# Patient Record
Sex: MALE | Race: WHITE | NOT HISPANIC OR LATINO | Employment: STUDENT | URBAN - METROPOLITAN AREA
[De-identification: names, ages, dates, MRNs, and addresses within clinical notes are randomized per-mention and may not be internally consistent; named-entity substitution may affect disease eponyms.]

---

## 2024-08-08 ENCOUNTER — PATIENT MESSAGE (OUTPATIENT)
Dept: ENDOCRINOLOGY | Facility: CLINIC | Age: 29
End: 2024-08-08
Payer: COMMERCIAL

## 2024-08-08 ENCOUNTER — OFFICE VISIT (OUTPATIENT)
Dept: ENDOCRINOLOGY | Facility: CLINIC | Age: 29
End: 2024-08-08
Attending: INTERNAL MEDICINE
Payer: COMMERCIAL

## 2024-08-08 DIAGNOSIS — Z79.899 TRANSGENDER WOMAN ON HORMONE THERAPY: Primary | ICD-10-CM

## 2024-08-08 DIAGNOSIS — F64.0 TRANSGENDER WOMAN ON HORMONE THERAPY: Primary | ICD-10-CM

## 2024-08-08 PROCEDURE — 99204 OFFICE O/P NEW MOD 45 MIN: CPT | Mod: 95,,, | Performed by: INTERNAL MEDICINE

## 2024-08-08 PROCEDURE — G2211 COMPLEX E/M VISIT ADD ON: HCPCS | Mod: 95,,, | Performed by: INTERNAL MEDICINE

## 2024-08-08 PROCEDURE — 1160F RVW MEDS BY RX/DR IN RCRD: CPT | Mod: CPTII,95,, | Performed by: INTERNAL MEDICINE

## 2024-08-08 PROCEDURE — 1159F MED LIST DOCD IN RCRD: CPT | Mod: CPTII,95,, | Performed by: INTERNAL MEDICINE

## 2024-08-08 RX ORDER — SPIRONOLACTONE 50 MG/1
50 TABLET, FILM COATED ORAL 2 TIMES DAILY
Qty: 180 TABLET | Refills: 3 | Status: SHIPPED | OUTPATIENT
Start: 2024-08-08

## 2024-08-08 RX ORDER — ATORVASTATIN CALCIUM 40 MG/1
40 TABLET, FILM COATED ORAL DAILY
COMMUNITY
Start: 2022-02-07

## 2024-08-08 RX ORDER — ESTRADIOL 1 MG/1
1 TABLET ORAL 2 TIMES DAILY
Qty: 180 TABLET | Refills: 3 | Status: SHIPPED | OUTPATIENT
Start: 2024-08-08

## 2024-08-21 ENCOUNTER — PATIENT MESSAGE (OUTPATIENT)
Dept: ENDOCRINOLOGY | Facility: CLINIC | Age: 29
End: 2024-08-21
Payer: COMMERCIAL

## 2024-09-25 ENCOUNTER — PATIENT MESSAGE (OUTPATIENT)
Dept: ENDOCRINOLOGY | Facility: CLINIC | Age: 29
End: 2024-09-25
Payer: COMMERCIAL

## 2024-10-04 ENCOUNTER — PATIENT MESSAGE (OUTPATIENT)
Dept: ENDOCRINOLOGY | Facility: CLINIC | Age: 29
End: 2024-10-04
Payer: COMMERCIAL

## 2024-11-29 ENCOUNTER — OFFICE VISIT (OUTPATIENT)
Dept: ENDOCRINOLOGY | Facility: CLINIC | Age: 29
End: 2024-11-29
Attending: INTERNAL MEDICINE
Payer: COMMERCIAL

## 2024-11-29 DIAGNOSIS — Z79.899 TRANSGENDER WOMAN ON HORMONE THERAPY: Primary | ICD-10-CM

## 2024-11-29 DIAGNOSIS — F64.0 TRANSGENDER WOMAN ON HORMONE THERAPY: Primary | ICD-10-CM

## 2024-11-29 RX ORDER — DUTASTERIDE 0.5 MG/1
0.5 CAPSULE, LIQUID FILLED ORAL DAILY
Qty: 90 CAPSULE | Refills: 3 | Status: SHIPPED | OUTPATIENT
Start: 2024-11-29 | End: 2025-11-29

## 2024-11-29 NOTE — PROGRESS NOTES
Subjective:      Patient ID: Vitaly Cr is a 29 y.o.    Chief Complaint:  gender     History of Present Illness  With regards to their  gender incongruence care:    Gender identity - female    Pronouns: she/her       Name legally changed?  no  Gender marker legally changed? 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    There is no height or weight on file to calculate BMI.      Physical Exam  Constitutional:       Appearance: Normal appearance.   Psychiatric:         Mood and Affect: Mood normal.         Behavior: Behavior normal.         Thought Content: Thought content normal.         Judgment: Judgment normal.               Lab Review:     Under the media tab.  lfts and TSH are normal   Hemoglobin A1c is 5.1    Assessment and Plan     Transgender woman on hormone therapy  Transwoman: gender incongruence   Reviewed therapy, side effects (both wanted and unwanted), possible adverse outcomes, expectations, compliance. Reviewed limited data on fertility, expect decline over time...           Will start  estradiol 1 mg po bid     Will start    aldactone 50 mg bid       We will  start dutasteride      RTO in 3 months with labs prior   Healthy lifestyle stressed  Discussed increased risk of dvt   Avoid prolonged immobility  D/c ert prior to any procedures           The patient location is: [place]  The chief complaint leading to consultation is: above     Visit type: audiovisual    Level of service is based on medical decision-making and not time      Each patient to whom he or she provides medical services by telemedicine is:  (1) informed of the relationship between the physician and patient and the respective role of any other health care provider with respect to management of the patient; and (2) notified that he or she may decline to receive medical services by telemedicine and may withdraw from such care at any time.

## 2024-11-29 NOTE — PATIENT INSTRUCTIONS
Thank you for completing a virtual visit with me!     Per our conversation,  I will send in a prescription for the hair loss medication.  Okay to start 3 medications at this time and we will plan a virtual visit in 3 months with blood work prior.  We will mail you the lab slip so you can have your labs drawn locally.     Please let me know if you have any other questions.    Thank you,  Neelam Mancini MD

## 2024-11-29 NOTE — ASSESSMENT & PLAN NOTE
Transwoman: gender incongruence   Reviewed therapy, side effects (both wanted and unwanted), possible adverse outcomes, expectations, compliance. Reviewed limited data on fertility, expect decline over time...           Will start  estradiol 1 mg po bid     Will start    aldactone 50 mg bid       We will start dutasteride      RTO in 3 months with labs prior   Healthy lifestyle stressed  Discussed increased risk of dvt   Avoid prolonged immobility  D/c ert prior to any procedures

## 2024-12-20 ENCOUNTER — PATIENT MESSAGE (OUTPATIENT)
Dept: ENDOCRINOLOGY | Facility: CLINIC | Age: 29
End: 2024-12-20
Payer: COMMERCIAL

## 2025-02-13 ENCOUNTER — PATIENT MESSAGE (OUTPATIENT)
Dept: ENDOCRINOLOGY | Facility: CLINIC | Age: 30
End: 2025-02-13
Payer: COMMERCIAL

## 2025-02-13 DIAGNOSIS — F64.0 TRANSGENDER WOMAN ON HORMONE THERAPY: Primary | ICD-10-CM

## 2025-02-13 DIAGNOSIS — Z79.899 TRANSGENDER WOMAN ON HORMONE THERAPY: Primary | ICD-10-CM

## 2025-03-03 ENCOUNTER — PATIENT MESSAGE (OUTPATIENT)
Dept: ENDOCRINOLOGY | Facility: CLINIC | Age: 30
End: 2025-03-03
Payer: COMMERCIAL

## 2025-03-14 ENCOUNTER — OFFICE VISIT (OUTPATIENT)
Dept: ENDOCRINOLOGY | Facility: CLINIC | Age: 30
End: 2025-03-14
Attending: INTERNAL MEDICINE
Payer: COMMERCIAL

## 2025-03-14 DIAGNOSIS — F64.0 TRANSGENDER WOMAN ON HORMONE THERAPY: Primary | ICD-10-CM

## 2025-03-14 DIAGNOSIS — Z79.899 TRANSGENDER WOMAN ON HORMONE THERAPY: Primary | ICD-10-CM

## 2025-03-14 RX ORDER — ESTRADIOL 2 MG/1
2 TABLET ORAL 2 TIMES DAILY
Qty: 180 TABLET | Refills: 3 | Status: SHIPPED | OUTPATIENT
Start: 2025-03-14

## 2025-03-14 RX ORDER — SPIRONOLACTONE 100 MG/1
100 TABLET, FILM COATED ORAL 2 TIMES DAILY
Qty: 180 TABLET | Refills: 3 | Status: SHIPPED | OUTPATIENT
Start: 2025-03-14

## 2025-03-14 NOTE — PATIENT INSTRUCTIONS
Thank you for completing a virtual visit with me!     Per our conversation, I will send in a new prescription for the higher dose estrogen and spironolactone.  We will plan a virtual visit in 3 months with blood work prior.      Please let me know if you have any other questions.    Thank you,  Neelam Mancini MD

## 2025-03-14 NOTE — ASSESSMENT & PLAN NOTE
Transwoman: gender incongruence   Reviewed therapy, side effects (both wanted and unwanted), possible adverse outcomes, expectations, compliance. Reviewed limited data on fertility, expect decline over time...         Will increase estradiol to 2 mg twice a day and Aldactone 100 mg twice a day.     Continue dutasteride      RTO in 3 months with labs prior   Healthy lifestyle stressed  Discussed increased risk of dvt   Avoid prolonged immobility  D/c ert prior to any procedures

## 2025-03-14 NOTE — PROGRESS NOTES
Subjective:      Patient ID: Vitaly Cr is a 29 y.o.    Chief Complaint:  gender     History of Present Illness  With regards to their  gender incongruence care:    Gender identity - female    Pronouns: she/her       Name legally changed?  no  Gender marker legally changed? no     Therapist at the time hormones were started: saw a counselor  - she was supportive        Gender Surgeries - none, but interested in FFS and breast augmentation       Fertility concerns - not  interested     started cross hormone therapy   11/29/24   Current regimen-   Estradiol 1 mg b.i.d.   Dutasteride   Spironolactone 50 mg twice a day    She is happy that she started   + some nipple sensitivity     Goals of therapy:  Breast tissue, body fat redistribution, have more feminine appearance, decrease body and facial hair     Social:  Work -     Decided to enroll in [employer]   [facility] counseling - 3 yrs     Relationship, orientation -open relationships    would describe more as asexual         Housing - lives with  parents - they are supportive      no dx of  Anxiety or depression      New concerns today:       None     ROS:   As above    Objective:     There were no vitals taken for this visit.    There is no height or weight on file to calculate BMI.      Physical Exam  Constitutional:       Appearance: Normal appearance.   Psychiatric:         Mood and Affect: Mood normal.         Behavior: Behavior normal.         Thought Content: Thought content normal.         Judgment: Judgment normal.               Lab Review:     Under the media tab.    3/12/25  T 75  E2 141  Cmp nl     Assessment and Plan     Transgender woman on hormone therapy  Transwoman: gender incongruence   Reviewed therapy, side effects (both wanted and unwanted), possible adverse outcomes, expectations, compliance. Reviewed limited data on fertility, expect decline over time...         Will increase estradiol to 2 mg twice a day and Aldactone 100 mg twice a  day.     Continue dutasteride      RTO in 3 months with labs prior   Healthy lifestyle stressed  Discussed increased risk of dvt   Avoid prolonged immobility  D/c ert prior to any procedures           The patient location is: [place]  The chief complaint leading to consultation is: above     Visit type: audiovisual    Level of service is based on medical decision-making and not time      Each patient to whom he or she provides medical services by telemedicine is:  (1) informed of the relationship between the physician and patient and the respective role of any other health care provider with respect to management of the patient; and (2) notified that he or she may decline to receive medical services by telemedicine and may withdraw from such care at any time.

## 2025-05-21 ENCOUNTER — PATIENT MESSAGE (OUTPATIENT)
Dept: ENDOCRINOLOGY | Facility: CLINIC | Age: 30
End: 2025-05-21
Payer: COMMERCIAL

## 2025-06-06 ENCOUNTER — PATIENT MESSAGE (OUTPATIENT)
Dept: ENDOCRINOLOGY | Facility: CLINIC | Age: 30
End: 2025-06-06
Payer: COMMERCIAL

## 2025-06-13 ENCOUNTER — OFFICE VISIT (OUTPATIENT)
Dept: ENDOCRINOLOGY | Facility: CLINIC | Age: 30
End: 2025-06-13
Attending: INTERNAL MEDICINE
Payer: COMMERCIAL

## 2025-06-13 ENCOUNTER — PATIENT MESSAGE (OUTPATIENT)
Dept: ENDOCRINOLOGY | Facility: CLINIC | Age: 30
End: 2025-06-13
Payer: COMMERCIAL

## 2025-06-13 DIAGNOSIS — Z79.899 TRANSGENDER WOMAN ON HORMONE THERAPY: Primary | ICD-10-CM

## 2025-06-13 DIAGNOSIS — F64.0 TRANSGENDER WOMAN ON HORMONE THERAPY: Primary | ICD-10-CM

## 2025-06-13 RX ORDER — PROGESTERONE 100 MG/1
100 CAPSULE ORAL NIGHTLY
Qty: 90 CAPSULE | Refills: 3 | Status: SHIPPED | OUTPATIENT
Start: 2025-06-13 | End: 2026-06-13

## 2025-06-13 NOTE — ASSESSMENT & PLAN NOTE
Transwoman: gender incongruence   Reviewed therapy, side effects (both wanted and unwanted), possible adverse outcomes, expectations, compliance. Reviewed limited data on fertility, expect decline over time...       Doing great  Add progesterone at bedtime noting limitations of data  Visit in 6 months       Healthy lifestyle stressed  Discussed increased risk of dvt   Avoid prolonged immobility  D/c ert prior to any procedures

## 2025-06-13 NOTE — PROGRESS NOTES
Subjective:      Patient ID: Vitaly Cr is a 30 y.o.    Chief Complaint:  gender     History of Present Illness  With regards to their  gender incongruence care:    Gender identity - female    Pronouns: she/her       Name legally changed?  no  Gender marker legally changed? no     Therapist at the time hormones were started: saw a counselor  - she was supportive        Gender Surgeries - none, but interested in FFS and breast augmentation       Fertility concerns - not  interested     started cross hormone therapy   11/29/24   Current regimen-   Estradiol 2 mg b.i.d.   Dutasteride   Spironolactone 100 mg twice a day    She is happy that she started   + breast growth - would like more  Mood is good  She is happy she started    Wants to start laser hair removal    Goals of therapy:  Breast tissue, body fat redistribution, have more feminine appearance, decrease body and facial hair     Social:  Work -     Job searching      Relationship, orientation -open relationships    would describe more as asexual         Housing - lives with  parents - they are supportive      no dx of  Anxiety or depression      New concerns today:       None     ROS:   As above    Objective:     There were no vitals taken for this visit.    There is no height or weight on file to calculate BMI.      Physical Exam  Constitutional:       Appearance: Normal appearance.   Psychiatric:         Mood and Affect: Mood normal.         Behavior: Behavior normal.         Thought Content: Thought content normal.         Judgment: Judgment normal.               Lab Review:     Under the media tab.    5/29/25  T 15   E2 203  Cmp ok     Assessment and Plan     Transgender woman on hormone therapy  Transwoman: gender incongruence   Reviewed therapy, side effects (both wanted and unwanted), possible adverse outcomes, expectations, compliance. Reviewed limited data on fertility, expect decline over time...       Doing great  Add progesterone at bedtime  noting limitations of data  Visit in 6 months       Healthy lifestyle stressed  Discussed increased risk of dvt   Avoid prolonged immobility  D/c ert prior to any procedures           The patient location is: [place]  The chief complaint leading to consultation is: above     Visit type: audiovisual    Level of service is based on medical decision-making and not time      Each patient to whom he or she provides medical services by telemedicine is:  (1) informed of the relationship between the physician and patient and the respective role of any other health care provider with respect to management of the patient; and (2) notified that he or she may decline to receive medical services by telemedicine and may withdraw from such care at any time.